# Patient Record
Sex: MALE | Race: WHITE | NOT HISPANIC OR LATINO | URBAN - METROPOLITAN AREA
[De-identification: names, ages, dates, MRNs, and addresses within clinical notes are randomized per-mention and may not be internally consistent; named-entity substitution may affect disease eponyms.]

---

## 2023-04-11 ENCOUNTER — OFFICE (OUTPATIENT)
Dept: URBAN - METROPOLITAN AREA CLINIC 45 | Facility: CLINIC | Age: 73
End: 2023-04-11

## 2023-04-11 VITALS — HEIGHT: 68 IN | WEIGHT: 186 LBS

## 2023-04-11 DIAGNOSIS — K76.0 FATTY (CHANGE OF) LIVER, NOT ELSEWHERE CLASSIFIED: ICD-10-CM

## 2023-04-11 DIAGNOSIS — Z86.010 PERSONAL HISTORY OF COLONIC POLYPS: ICD-10-CM

## 2023-04-11 DIAGNOSIS — R14.0 ABDOMINAL BLOATING: ICD-10-CM

## 2023-04-11 DIAGNOSIS — Z95.5 STENTED CORONARY ARTERY: ICD-10-CM

## 2023-04-11 DIAGNOSIS — R14.2 BELCHING SYMPTOM: ICD-10-CM

## 2023-04-11 DIAGNOSIS — D69.6 THROMBOCYTOPENIA: ICD-10-CM

## 2023-04-11 PROCEDURE — 99204 OFFICE O/P NEW MOD 45 MIN: CPT | Mod: 95 | Performed by: INTERNAL MEDICINE

## 2023-04-11 NOTE — SERVICEHPINOTES
The patient is scheduled today for a telehealth visit, due to current mandate for social distancing on account of the COVID-19 Pandemic. The clinician is connected to a secure network. The patient consents to this teleconference being a billable service.   This visit used [email or web address] for a live, interactive audio and video telehealth visit.   Patient presents for evaluation of fatty liver and thrombocytopenia discovered by prior labs and a recent abdominal ultrasound. He reports frequent excessive belching, bloating/flatulence. There has been a previous unremarkable EGD and colon screening. Earlier colonoscopy reportedly included removal of adenomatous colon polyp.. H. pylori infection was previously detected and successfully eradicated documented by negative breath test. The patient has no family history of colon cancer or other significant GI diseases. Patient denies fever, nausea, vomiting, dysphagia, reflux, abdominal pain, change in bowel habits, constipation, diarrhea, rectal bleeding, melena, and significant change in weight. Denies shortness of breath and chest pain.

## 2023-08-04 ENCOUNTER — HOSPITAL ENCOUNTER (INPATIENT)
Dept: HOSPITAL 54 - DS | Age: 73
Discharge: HOME | DRG: 908 | End: 2023-08-04
Attending: INTERNAL MEDICINE | Admitting: INTERNAL MEDICINE
Payer: MEDICARE

## 2023-08-04 VITALS — TEMPERATURE: 97.6 F | SYSTOLIC BLOOD PRESSURE: 140 MMHG | DIASTOLIC BLOOD PRESSURE: 76 MMHG

## 2023-08-04 VITALS — DIASTOLIC BLOOD PRESSURE: 76 MMHG | SYSTOLIC BLOOD PRESSURE: 140 MMHG | OXYGEN SATURATION: 94 % | TEMPERATURE: 97.4 F

## 2023-08-04 VITALS — DIASTOLIC BLOOD PRESSURE: 88 MMHG | SYSTOLIC BLOOD PRESSURE: 148 MMHG | TEMPERATURE: 97.7 F | OXYGEN SATURATION: 95 %

## 2023-08-04 VITALS — HEIGHT: 67 IN | WEIGHT: 180 LBS | BODY MASS INDEX: 28.25 KG/M2

## 2023-08-04 VITALS — SYSTOLIC BLOOD PRESSURE: 138 MMHG | DIASTOLIC BLOOD PRESSURE: 84 MMHG | TEMPERATURE: 97.5 F | OXYGEN SATURATION: 96 %

## 2023-08-04 VITALS — TEMPERATURE: 98.2 F | SYSTOLIC BLOOD PRESSURE: 126 MMHG | OXYGEN SATURATION: 100 % | DIASTOLIC BLOOD PRESSURE: 81 MMHG

## 2023-08-04 VITALS — SYSTOLIC BLOOD PRESSURE: 142 MMHG | OXYGEN SATURATION: 95 % | TEMPERATURE: 97.2 F | DIASTOLIC BLOOD PRESSURE: 86 MMHG

## 2023-08-04 VITALS — DIASTOLIC BLOOD PRESSURE: 81 MMHG | SYSTOLIC BLOOD PRESSURE: 136 MMHG | OXYGEN SATURATION: 94 % | TEMPERATURE: 97.6 F

## 2023-08-04 VITALS — TEMPERATURE: 98 F | OXYGEN SATURATION: 95 % | DIASTOLIC BLOOD PRESSURE: 75 MMHG | SYSTOLIC BLOOD PRESSURE: 135 MMHG

## 2023-08-04 DIAGNOSIS — Z95.5: ICD-10-CM

## 2023-08-04 DIAGNOSIS — M84.48XK: ICD-10-CM

## 2023-08-04 DIAGNOSIS — E78.5: ICD-10-CM

## 2023-08-04 DIAGNOSIS — T86.831: Primary | ICD-10-CM
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
## 2023-08-04 DIAGNOSIS — I10: ICD-10-CM

## 2023-08-04 DIAGNOSIS — M27.40: ICD-10-CM

## 2023-08-04 DIAGNOSIS — I25.10: ICD-10-CM

## 2023-08-04 PROCEDURE — 0NST04Z REPOSITION RIGHT MANDIBLE WITH INTERNAL FIXATION DEVICE, OPEN APPROACH: ICD-10-PCS | Performed by: DENTIST

## 2023-08-04 PROCEDURE — A4223 INFUSION SUPPLIES W/O PUMP: HCPCS

## 2023-08-04 PROCEDURE — G0378 HOSPITAL OBSERVATION PER HR: HCPCS

## 2023-08-04 PROCEDURE — 0NBV0ZX EXCISION OF LEFT MANDIBLE, OPEN APPROACH, DIAGNOSTIC: ICD-10-PCS | Performed by: DENTIST

## 2023-08-04 PROCEDURE — C1713 ANCHOR/SCREW BN/BN,TIS/BN: HCPCS

## 2023-08-04 PROCEDURE — 0NBT0ZX EXCISION OF RIGHT MANDIBLE, OPEN APPROACH, DIAGNOSTIC: ICD-10-PCS | Performed by: DENTIST

## 2023-08-04 PROCEDURE — 0NUT07Z SUPPLEMENT RIGHT MANDIBLE WITH AUTOLOGOUS TISSUE SUBSTITUTE, OPEN APPROACH: ICD-10-PCS | Performed by: DENTIST

## 2023-08-04 PROCEDURE — 0NSV04Z REPOSITION LEFT MANDIBLE WITH INTERNAL FIXATION DEVICE, OPEN APPROACH: ICD-10-PCS | Performed by: DENTIST

## 2024-02-15 ENCOUNTER — HOSPITAL ENCOUNTER (INPATIENT)
Dept: HOSPITAL 54 - DS | Age: 74
Discharge: LEFT BEFORE DISCHARGE | DRG: 496 | End: 2024-02-15
Attending: NURSE PRACTITIONER | Admitting: NURSE PRACTITIONER
Payer: MEDICARE

## 2024-02-15 DIAGNOSIS — T84.69XA: Primary | ICD-10-CM

## 2024-02-15 DIAGNOSIS — I25.10: ICD-10-CM

## 2024-02-15 DIAGNOSIS — Z87.891: ICD-10-CM

## 2024-02-15 DIAGNOSIS — E78.5: ICD-10-CM

## 2024-02-15 DIAGNOSIS — I10: ICD-10-CM

## 2024-02-15 DIAGNOSIS — T81.83XA: ICD-10-CM
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
## 2024-02-15 PROCEDURE — 0NPW04Z REMOVAL OF INTERNAL FIXATION DEVICE FROM FACIAL BONE, OPEN APPROACH: ICD-10-PCS | Performed by: DENTIST

## 2024-02-15 PROCEDURE — G0378 HOSPITAL OBSERVATION PER HR: HCPCS

## 2024-02-15 PROCEDURE — A4223 INFUSION SUPPLIES W/O PUMP: HCPCS

## 2024-02-15 PROCEDURE — 0WB30ZX EXCISION OF ORAL CAVITY AND THROAT, OPEN APPROACH, DIAGNOSTIC: ICD-10-PCS | Performed by: DENTIST

## 2024-02-15 PROCEDURE — 0NBV0ZZ EXCISION OF LEFT MANDIBLE, OPEN APPROACH: ICD-10-PCS | Performed by: DENTIST

## 2024-02-15 PROCEDURE — 0NBT0ZZ EXCISION OF RIGHT MANDIBLE, OPEN APPROACH: ICD-10-PCS | Performed by: DENTIST
